# Patient Record
Sex: MALE | Race: WHITE | HISPANIC OR LATINO | Employment: FULL TIME | URBAN - METROPOLITAN AREA
[De-identification: names, ages, dates, MRNs, and addresses within clinical notes are randomized per-mention and may not be internally consistent; named-entity substitution may affect disease eponyms.]

---

## 2022-03-14 PROCEDURE — U0004 COV-19 TEST NON-CDC HGH THRU: HCPCS | Performed by: NURSE PRACTITIONER

## 2022-03-18 ENCOUNTER — TELEPHONE (OUTPATIENT)
Dept: URGENT CARE | Facility: CLINIC | Age: 42
End: 2022-03-18

## 2025-01-04 ENCOUNTER — HOSPITAL ENCOUNTER (EMERGENCY)
Facility: HOSPITAL | Age: 45
Discharge: HOME | End: 2025-01-05
Attending: EMERGENCY MEDICINE | Admitting: EMERGENCY MEDICINE
Payer: MEDICAID

## 2025-01-04 ENCOUNTER — APPOINTMENT (OUTPATIENT)
Dept: GENERAL RADIOLOGY | Facility: HOSPITAL | Age: 45
End: 2025-01-04
Payer: MEDICAID

## 2025-01-04 DIAGNOSIS — J40 BRONCHITIS: Primary | ICD-10-CM

## 2025-01-04 LAB
FLUAV SUBTYP SPEC NAA+PROBE: NOT DETECTED
FLUBV RNA ISLT QL NAA+PROBE: NOT DETECTED
SARS-COV-2 RNA RESP QL NAA+PROBE: NOT DETECTED

## 2025-01-04 PROCEDURE — 87636 SARSCOV2 & INF A&B AMP PRB: CPT

## 2025-01-04 PROCEDURE — 99283 EMERGENCY DEPT VISIT LOW MDM: CPT

## 2025-01-04 PROCEDURE — 71045 X-RAY EXAM CHEST 1 VIEW: CPT

## 2025-01-05 VITALS
DIASTOLIC BLOOD PRESSURE: 84 MMHG | SYSTOLIC BLOOD PRESSURE: 136 MMHG | TEMPERATURE: 98 F | HEART RATE: 74 BPM | RESPIRATION RATE: 18 BRPM | HEIGHT: 70 IN | OXYGEN SATURATION: 97 % | BODY MASS INDEX: 44.09 KG/M2 | WEIGHT: 308 LBS

## 2025-01-05 PROCEDURE — 94640 AIRWAY INHALATION TREATMENT: CPT

## 2025-01-05 PROCEDURE — 63710000001 PREDNISONE PER 1 MG: Performed by: EMERGENCY MEDICINE

## 2025-01-05 RX ORDER — IPRATROPIUM BROMIDE AND ALBUTEROL SULFATE 2.5; .5 MG/3ML; MG/3ML
3 SOLUTION RESPIRATORY (INHALATION) ONCE
Status: COMPLETED | OUTPATIENT
Start: 2025-01-05 | End: 2025-01-05

## 2025-01-05 RX ORDER — PREDNISONE 20 MG/1
40 TABLET ORAL DAILY
Qty: 10 TABLET | Refills: 0 | Status: SHIPPED | OUTPATIENT
Start: 2025-01-05 | End: 2025-01-10

## 2025-01-05 RX ORDER — GUAIFENESIN AND DEXTROMETHORPHAN HYDROBROMIDE 600; 30 MG/1; MG/1
1 TABLET, EXTENDED RELEASE ORAL 2 TIMES DAILY PRN
Qty: 30 TABLET | Refills: 0 | Status: SHIPPED | OUTPATIENT
Start: 2025-01-05

## 2025-01-05 RX ORDER — ALBUTEROL SULFATE 90 UG/1
2 INHALANT RESPIRATORY (INHALATION) EVERY 6 HOURS PRN
Qty: 8 G | Refills: 0 | Status: SHIPPED | OUTPATIENT
Start: 2025-01-05

## 2025-01-05 RX ORDER — PREDNISONE 20 MG/1
60 TABLET ORAL ONCE
Status: COMPLETED | OUTPATIENT
Start: 2025-01-05 | End: 2025-01-05

## 2025-01-05 RX ADMIN — PREDNISONE 60 MG: 20 TABLET ORAL at 00:21

## 2025-01-05 RX ADMIN — IPRATROPIUM BROMIDE AND ALBUTEROL SULFATE 3 ML: .5; 3 SOLUTION RESPIRATORY (INHALATION) at 00:21

## 2025-01-05 NOTE — FSED PROVIDER NOTE
Subjective   History of Present Illness  44 yom reports productive cough, congestion and sore throat for the past several days. He denies fever. He reports his wife has similar symptoms at home.       Review of Systems   Constitutional: Negative.    HENT:  Positive for congestion, rhinorrhea and sore throat.    Respiratory:  Positive for cough and chest tightness. Negative for shortness of breath.    All other systems reviewed and are negative.      Past Medical History:   Diagnosis Date    Depression        No Known Allergies    Past Surgical History:   Procedure Laterality Date    NOSE SURGERY         History reviewed. No pertinent family history.    Social History     Socioeconomic History    Marital status:    Tobacco Use    Smoking status: Never    Smokeless tobacco: Never   Vaping Use    Vaping status: Never Used   Substance and Sexual Activity    Alcohol use: Yes     Comment: occasionally    Drug use: Defer    Sexual activity: Defer           Objective   Physical Exam  Vitals reviewed.   Constitutional:       Appearance: Normal appearance.   HENT:      Head: Normocephalic and atraumatic.      Mouth/Throat:      Mouth: Mucous membranes are moist.      Pharynx: Oropharynx is clear.   Eyes:      Extraocular Movements: Extraocular movements intact.      Pupils: Pupils are equal, round, and reactive to light.   Cardiovascular:      Rate and Rhythm: Normal rate and regular rhythm.      Pulses: Normal pulses.      Heart sounds: Normal heart sounds.   Pulmonary:      Effort: Pulmonary effort is normal. No respiratory distress.      Breath sounds: Wheezing present.   Skin:     General: Skin is warm and dry.      Capillary Refill: Capillary refill takes less than 2 seconds.   Neurological:      Mental Status: He is alert.         Procedures           ED Course                                           Medical Decision Making  This patient presents with symptoms suspicious for likely viral upper respiratory  infection. Based on history and physical doubt sinusitis. COVID/Flu negative. CXR NAD. Do not suspect underlying cardiopulmonary process. I considered, but think unlikely, dangerous causes of this patient's symptoms to include ACS, CHF or COPD exacerbations, pneumonia, pneumothorax. Patient is nontoxic appearing and not in need of emergent medical intervention.    Problems Addressed:  Bronchitis: complicated acute illness or injury    Amount and/or Complexity of Data Reviewed  Radiology: ordered.    Risk  OTC drugs.  Prescription drug management.        Final diagnoses:   Bronchitis       ED Disposition  ED Disposition       ED Disposition   Discharge    Condition   Stable    Comment   --               Josefina Ely MD  [address] [facility] [place] [id number] [phone]    Schedule an appointment as soon as possible for a visit on 1/8/2025  re-evaluation         Medication List        New Prescriptions      guaifenesin-dextromethorphan 600-30 mg  MG tablet sustained-release 12 hour  Take 1 tablet by mouth 2 (Two) Times a Day As Needed (cough).     predniSONE 20 MG tablet  Commonly known as: DELTASONE  Take 2 tablets by mouth Daily for 5 days.               Where to Get Your Medications        These medications were sent to [facility] [id number] [place], [place] - [address] [place] [address] [address]  - [phone] [facility] [phone] [address] [place] [address]      Phone: [phone]   albuterol sulfate  (90 Base) MCG/ACT inhaler  guaifenesin-dextromethorphan 600-30 mg  MG tablet sustained-release 12 hour  predniSONE 20 MG tablet

## 2025-01-10 ENCOUNTER — HOSPITAL ENCOUNTER (EMERGENCY)
Facility: HOSPITAL | Age: 45
Discharge: HOME | End: 2025-01-10
Attending: EMERGENCY MEDICINE
Payer: MEDICAID

## 2025-01-10 ENCOUNTER — APPOINTMENT (OUTPATIENT)
Dept: CT IMAGING | Facility: HOSPITAL | Age: 45
End: 2025-01-10
Payer: MEDICAID

## 2025-01-10 VITALS
SYSTOLIC BLOOD PRESSURE: 126 MMHG | WEIGHT: 308.64 LBS | RESPIRATION RATE: 20 BRPM | HEART RATE: 87 BPM | TEMPERATURE: 100 F | BODY MASS INDEX: 44.19 KG/M2 | HEIGHT: 70 IN | OXYGEN SATURATION: 96 % | DIASTOLIC BLOOD PRESSURE: 92 MMHG

## 2025-01-10 DIAGNOSIS — J03.00 STREP TONSILLITIS: Primary | ICD-10-CM

## 2025-01-10 LAB
ANION GAP SERPL CALCULATED.3IONS-SCNC: 8.1 MMOL/L (ref 5–15)
BASOPHILS # BLD AUTO: 0.02 10*3/MM3 (ref 0–0.2)
BASOPHILS NFR BLD AUTO: 0.1 % (ref 0–1.5)
BUN SERPL-MCNC: 17 MG/DL (ref 6–20)
BUN/CREAT SERPL: 18.1 (ref 7–25)
CALCIUM SPEC-SCNC: 9.4 MG/DL (ref 8.6–10.5)
CHLORIDE SERPL-SCNC: 103 MMOL/L (ref 98–107)
CO2 SERPL-SCNC: 27.9 MMOL/L (ref 22–29)
CREAT SERPL-MCNC: 0.94 MG/DL (ref 0.76–1.27)
DEPRECATED RDW RBC AUTO: 43.6 FL (ref 37–54)
EGFRCR SERPLBLD CKD-EPI 2021: 101.2 ML/MIN/1.73
EOSINOPHIL # BLD AUTO: 0.07 10*3/MM3 (ref 0–0.4)
EOSINOPHIL NFR BLD AUTO: 0.5 % (ref 0.3–6.2)
ERYTHROCYTE [DISTWIDTH] IN BLOOD BY AUTOMATED COUNT: 13.4 % (ref 12.3–15.4)
GLUCOSE SERPL-MCNC: 124 MG/DL (ref 65–99)
HCT VFR BLD AUTO: 43.9 % (ref 37.5–51)
HGB BLD-MCNC: 14.3 G/DL (ref 13–17.7)
IMM GRANULOCYTES # BLD AUTO: 0.04 10*3/MM3 (ref 0–0.05)
IMM GRANULOCYTES NFR BLD AUTO: 0.3 % (ref 0–0.5)
LYMPHOCYTES # BLD AUTO: 2.95 10*3/MM3 (ref 0.7–3.1)
LYMPHOCYTES NFR BLD AUTO: 19 % (ref 19.6–45.3)
MCH RBC QN AUTO: 28.8 PG (ref 26.6–33)
MCHC RBC AUTO-ENTMCNC: 32.6 G/DL (ref 31.5–35.7)
MCV RBC AUTO: 88.3 FL (ref 79–97)
MONOCYTES # BLD AUTO: 1.17 10*3/MM3 (ref 0.1–0.9)
MONOCYTES NFR BLD AUTO: 7.5 % (ref 5–12)
NEUTROPHILS NFR BLD AUTO: 11.27 10*3/MM3 (ref 1.7–7)
NEUTROPHILS NFR BLD AUTO: 72.6 % (ref 42.7–76)
PLATELET # BLD AUTO: 211 10*3/MM3 (ref 140–450)
PMV BLD AUTO: 9.9 FL (ref 6–12)
POTASSIUM SERPL-SCNC: 3.7 MMOL/L (ref 3.5–5.2)
RBC # BLD AUTO: 4.97 10*6/MM3 (ref 4.14–5.8)
SODIUM SERPL-SCNC: 139 MMOL/L (ref 136–145)
STREP A PCR: DETECTED
WBC NRBC COR # BLD AUTO: 15.52 10*3/MM3 (ref 3.4–10.8)

## 2025-01-10 PROCEDURE — 96375 TX/PRO/DX INJ NEW DRUG ADDON: CPT

## 2025-01-10 PROCEDURE — 70491 CT SOFT TISSUE NECK W/DYE: CPT

## 2025-01-10 PROCEDURE — 25510000001 IOPAMIDOL PER 1 ML: Performed by: EMERGENCY MEDICINE

## 2025-01-10 PROCEDURE — 25810000003 SODIUM CHLORIDE 0.9 % SOLUTION: Performed by: EMERGENCY MEDICINE

## 2025-01-10 PROCEDURE — 99285 EMERGENCY DEPT VISIT HI MDM: CPT

## 2025-01-10 PROCEDURE — 87651 STREP A DNA AMP PROBE: CPT | Performed by: EMERGENCY MEDICINE

## 2025-01-10 PROCEDURE — 99284 EMERGENCY DEPT VISIT MOD MDM: CPT | Performed by: EMERGENCY MEDICINE

## 2025-01-10 PROCEDURE — 85025 COMPLETE CBC W/AUTO DIFF WBC: CPT | Performed by: EMERGENCY MEDICINE

## 2025-01-10 PROCEDURE — 80048 BASIC METABOLIC PNL TOTAL CA: CPT | Performed by: EMERGENCY MEDICINE

## 2025-01-10 PROCEDURE — 25010000002 KETOROLAC TROMETHAMINE PER 15 MG: Performed by: EMERGENCY MEDICINE

## 2025-01-10 PROCEDURE — 96365 THER/PROPH/DIAG IV INF INIT: CPT

## 2025-01-10 PROCEDURE — 25010000002 CEFTRIAXONE PER 250 MG: Performed by: EMERGENCY MEDICINE

## 2025-01-10 RX ORDER — LIDOCAINE HYDROCHLORIDE 20 MG/ML
5 SOLUTION OROPHARYNGEAL ONCE
Status: COMPLETED | OUTPATIENT
Start: 2025-01-10 | End: 2025-01-10

## 2025-01-10 RX ORDER — IOPAMIDOL 755 MG/ML
100 INJECTION, SOLUTION INTRAVASCULAR
Status: COMPLETED | OUTPATIENT
Start: 2025-01-10 | End: 2025-01-10

## 2025-01-10 RX ORDER — HYDROCODONE BITARTRATE AND ACETAMINOPHEN 5; 325 MG/1; MG/1
1 TABLET ORAL EVERY 6 HOURS PRN
Qty: 8 TABLET | Refills: 0 | Status: SHIPPED | OUTPATIENT
Start: 2025-01-10

## 2025-01-10 RX ORDER — AMOXICILLIN 875 MG/1
875 TABLET, COATED ORAL 2 TIMES DAILY
Qty: 20 TABLET | Refills: 0 | Status: SHIPPED | OUTPATIENT
Start: 2025-01-10 | End: 2025-01-20

## 2025-01-10 RX ORDER — KETOROLAC TROMETHAMINE 30 MG/ML
15 INJECTION, SOLUTION INTRAMUSCULAR; INTRAVENOUS ONCE
Status: COMPLETED | OUTPATIENT
Start: 2025-01-10 | End: 2025-01-10

## 2025-01-10 RX ADMIN — LIDOCAINE HYDROCHLORIDE 5 ML: 20 SOLUTION ORAL at 05:15

## 2025-01-10 RX ADMIN — CEFTRIAXONE 1000 MG: 1 INJECTION, POWDER, FOR SOLUTION INTRAMUSCULAR; INTRAVENOUS at 05:53

## 2025-01-10 RX ADMIN — KETOROLAC TROMETHAMINE 15 MG: 30 INJECTION, SOLUTION INTRAMUSCULAR; INTRAVENOUS at 05:53

## 2025-01-10 RX ADMIN — SODIUM CHLORIDE 500 ML: 9 INJECTION, SOLUTION INTRAVENOUS at 05:52

## 2025-01-10 RX ADMIN — ALUMINUM HYDROXIDE, MAGNESIUM HYDROXIDE, AND DIMETHICONE: 400; 400; 40 SUSPENSION ORAL at 05:15

## 2025-01-10 RX ADMIN — IOPAMIDOL 100 ML: 755 INJECTION, SOLUTION INTRAVENOUS at 05:18

## 2025-01-10 NOTE — Clinical Note
[facility] FSED [place] [address] [place] [address] [place] [address]  Phone: [phone]    Ana Sherman was seen and treated in our emergency department on 1/10/2025.  He may return to work on 01/14/2025.         Thank you for choosing [facility].    Bess Yan MD

## 2025-01-10 NOTE — FSED PROVIDER NOTE
Subjective   History of Present Illness  44 yom complains of sore throat and pain with swallowing. The patient has been taking Prednisone for a diagnosis of bronchitis last week. He reports the medication is not helping his sore throat but his cough his better. He notes it hurts to swallow.       Review of Systems   Constitutional: Negative.    HENT:  Positive for congestion, sore throat, trouble swallowing and voice change.    All other systems reviewed and are negative.      Past Medical History:   Diagnosis Date    Depression        No Known Allergies    Past Surgical History:   Procedure Laterality Date    NOSE SURGERY         History reviewed. No pertinent family history.    Social History     Socioeconomic History    Marital status:    Tobacco Use    Smoking status: Never    Smokeless tobacco: Never   Vaping Use    Vaping status: Never Used   Substance and Sexual Activity    Alcohol use: Yes     Comment: occasionally    Drug use: Defer    Sexual activity: Defer           Objective   Physical Exam  Vitals reviewed.   Constitutional:       Appearance: He is obese.   HENT:      Head: Normocephalic and atraumatic.      Mouth/Throat:      Mouth: Mucous membranes are moist.      Pharynx: Uvula midline. Pharyngeal swelling and posterior oropharyngeal erythema present.      Tonsils: No tonsillar exudate. 2+ on the right. 2+ on the left.   Eyes:      Conjunctiva/sclera: Conjunctivae normal.   Cardiovascular:      Rate and Rhythm: Normal rate and regular rhythm.   Pulmonary:      Effort: Pulmonary effort is normal.      Breath sounds: Normal breath sounds.   Musculoskeletal:      Cervical back: Normal range of motion and neck supple.   Skin:     General: Skin is warm and dry.   Neurological:      Mental Status: He is alert.         Procedures           ED Course  ED Course as of 01/10/25 0630   Fri Reynold 10, 2025   0550 Discussed test results and treatment plan.  [BM]      ED Course User Index  [BM] Bess Yan  MD Andie                                           Medical Decision Making  44 yom complains of sore throat and pain with swallowing for 6 days. Pt was found to be positive for strep. Pt had tonsillar enlargement on exam therefore a CT was obtained. No evidence of  PTA. Pt give IV Rocephin. Pt was able to tolerate oral fluids and medication in the ER. No stridor or respiratory distress. Pt prescribed Amoxicillin and Hydrocodone for pain. Return precautions discussed.     Problems Addressed:  Strep tonsillitis: complicated acute illness or injury    Amount and/or Complexity of Data Reviewed  Labs: ordered.  Radiology: ordered.    Risk  Prescription drug management.        Final diagnoses:   Strep tonsillitis       ED Disposition  ED Disposition       ED Disposition   Discharge    Condition   Stable    Comment   --               Josefina Ely MD  [address] [place] [address] [phone]    Schedule an appointment as soon as possible for a visit on 1/13/2025           Medication List        New Prescriptions      amoxicillin 875 MG tablet  Commonly known as: AMOXIL  Take 1 tablet by mouth 2 (Two) Times a Day for 10 days.     HYDROcodone-acetaminophen 5-325 MG per tablet  Commonly known as: NORCO  Take 1 tablet by mouth Every 6 (Six) Hours As Needed (pain).            Stop      promethazine-dextromethorphan 6.25-15 MG/5ML syrup  Commonly known as: PROMETHAZINE-DM               Where to Get Your Medications        These medications were sent to [facility] [id number] - [place], [place] - [address] [place] [address] [address]  - [phone]  - [phone] [address] [place] [address]      Phone: [phone]   amoxicillin 875 MG tablet  HYDROcodone-acetaminophen 5-325 MG per tablet

## 2025-01-10 NOTE — Clinical Note
[facility] FSED [place] [address] [place] [address] [place] [address]  Phone: [phone]    Ana Sherman was seen and treated in our emergency department on 1/10/2025.  He may return to work on 01/14/2025.         Thank you for choosing [facility].    Bess Yan MD

## 2025-01-20 ENCOUNTER — HOSPITAL ENCOUNTER (OUTPATIENT)
Facility: HOSPITAL | Age: 45
Discharge: HOME | End: 2025-01-20
Attending: EMERGENCY MEDICINE | Admitting: EMERGENCY MEDICINE

## 2025-01-20 VITALS
HEIGHT: 70 IN | BODY MASS INDEX: 44.19 KG/M2 | SYSTOLIC BLOOD PRESSURE: 150 MMHG | OXYGEN SATURATION: 98 % | DIASTOLIC BLOOD PRESSURE: 98 MMHG | WEIGHT: 308.7 LBS | TEMPERATURE: 98.1 F | RESPIRATION RATE: 18 BRPM | HEART RATE: 80 BPM

## 2025-01-20 DIAGNOSIS — J03.90 TONSILLITIS: Primary | ICD-10-CM

## 2025-01-20 DIAGNOSIS — J02.9 SORE THROAT: ICD-10-CM

## 2025-01-20 LAB
FLUAV SUBTYP SPEC NAA+PROBE: NOT DETECTED
FLUBV RNA ISLT QL NAA+PROBE: NOT DETECTED
SARS-COV-2 RNA RESP QL NAA+PROBE: NOT DETECTED
STREP A PCR: NOT DETECTED

## 2025-01-20 PROCEDURE — 87651 STREP A DNA AMP PROBE: CPT | Performed by: EMERGENCY MEDICINE

## 2025-01-20 PROCEDURE — 87636 SARSCOV2 & INF A&B AMP PRB: CPT | Performed by: EMERGENCY MEDICINE

## 2025-01-20 PROCEDURE — 25010000002 KETOROLAC TROMETHAMINE PER 15 MG

## 2025-01-20 PROCEDURE — 25010000002 METHYLPREDNISOLONE PER 40 MG

## 2025-01-20 PROCEDURE — G0463 HOSPITAL OUTPT CLINIC VISIT: HCPCS | Performed by: EMERGENCY MEDICINE

## 2025-01-20 RX ORDER — METHYLPREDNISOLONE SODIUM SUCCINATE 40 MG/ML
40 INJECTION, POWDER, LYOPHILIZED, FOR SOLUTION INTRAMUSCULAR; INTRAVENOUS ONCE
Status: COMPLETED | OUTPATIENT
Start: 2025-01-20 | End: 2025-01-20

## 2025-01-20 RX ORDER — CETIRIZINE HYDROCHLORIDE, PSEUDOEPHEDRINE HYDROCHLORIDE 5; 120 MG/1; MG/1
1 TABLET, FILM COATED, EXTENDED RELEASE ORAL 2 TIMES DAILY
Qty: 14 TABLET | Refills: 0 | Status: SHIPPED | OUTPATIENT
Start: 2025-01-20 | End: 2025-01-27

## 2025-01-20 RX ORDER — METHYLPREDNISOLONE 4 MG/1
TABLET ORAL
Qty: 21 TABLET | Refills: 0 | Status: SHIPPED | OUTPATIENT
Start: 2025-01-20

## 2025-01-20 RX ORDER — KETOROLAC TROMETHAMINE 30 MG/ML
30 INJECTION, SOLUTION INTRAMUSCULAR; INTRAVENOUS ONCE
Status: COMPLETED | OUTPATIENT
Start: 2025-01-20 | End: 2025-01-20

## 2025-01-20 RX ADMIN — METHYLPREDNISOLONE SODIUM SUCCINATE 40 MG: 40 INJECTION, POWDER, FOR SOLUTION INTRAMUSCULAR; INTRAVENOUS at 19:44

## 2025-01-20 RX ADMIN — KETOROLAC TROMETHAMINE 30 MG: 30 INJECTION, SOLUTION INTRAMUSCULAR at 19:44

## 2025-01-21 NOTE — DISCHARGE INSTRUCTIONS
Starting tomorrow you can take 800 mg of ibuprofen morning and evening of 1000 mg of Tylenol at noon for the next 4 days.    Recommend that you increase your fluid intake with water warm tea with honey which is soothing to your throat    For nasal congestion and drainage in the back your throat begin taking Zyrtec-D    Follow-up with your family doctor as needed return to ER for worsening symptom

## 2025-01-21 NOTE — FSED PROVIDER NOTE
Subjective   History of Present Illness  44-year-old male was seen at this facility last week and diagnosed with strep throat.  The patient had a CT scan of his neck to rule out tonsillar abscess.  Patient was discharged home on Augmentin.  He reports that he has finished his Augmentin.  He reports that he is still having sore throat but that overall his symptoms have improved.  He reports that he took 200 mg of ibuprofen yesterday but has not taken any pain medication today.  He is denying any difficulty swallowing he reports that he has been eating and drinking today.  He denies fever.  He is denying any new symptoms other than concerned that he still has a sore throat after being treated for strep.        Review of Systems   All other systems reviewed and are negative.      Past Medical History:   Diagnosis Date    Depression        No Known Allergies    Past Surgical History:   Procedure Laterality Date    NOSE SURGERY         No family history on file.    Social History     Socioeconomic History    Marital status:    Tobacco Use    Smoking status: Never    Smokeless tobacco: Never   Vaping Use    Vaping status: Never Used   Substance and Sexual Activity    Alcohol use: Yes     Comment: occasionally    Drug use: Defer    Sexual activity: Defer           Objective   Physical Exam  Vitals and nursing note reviewed.   Constitutional:       General: He is not in acute distress.     Appearance: Normal appearance. He is well-developed and normal weight. He is not ill-appearing or toxic-appearing.   HENT:      Head: Normocephalic and atraumatic.      Right Ear: Tympanic membrane and ear canal normal.      Left Ear: Tympanic membrane and ear canal normal.      Nose: Nose normal. No congestion.      Mouth/Throat:      Mouth: Mucous membranes are moist.      Pharynx: Oropharynx is clear. Uvula midline.      Tonsils: No tonsillar exudate or tonsillar abscesses. 1+ on the right. 1+ on the left.      Comments: Uvula  is midline the oropharynx is slightly red and patient does have thick clear post nasal drainage in the oropharynx  Eyes:      Conjunctiva/sclera: Conjunctivae normal.      Pupils: Pupils are equal, round, and reactive to light.   Cardiovascular:      Rate and Rhythm: Normal rate.      Heart sounds: Normal heart sounds.   Pulmonary:      Effort: Pulmonary effort is normal. No respiratory distress.      Breath sounds: Normal breath sounds. Stridor present. No wheezing, rhonchi or rales.   Abdominal:      General: Bowel sounds are normal.      Palpations: Abdomen is soft.   Musculoskeletal:         General: Normal range of motion.      Cervical back: Normal range of motion and neck supple.   Lymphadenopathy:      Cervical: No cervical adenopathy.   Skin:     General: Skin is warm.      Capillary Refill: Capillary refill takes less than 2 seconds.   Neurological:      General: No focal deficit present.      Mental Status: He is alert and oriented to person, place, and time. Mental status is at baseline.         Procedures           ED Course  ED Course as of 01/20/25 2102 Mon Jan 20, 2025   1904 COVID influenza strep not detected [WF]      ED Course User Index  [WF] Jean Paul Silva Jr., APRN                                           Medical Decision Making  Through the  I was able to explain the patient that he needs to increase his over-the-counter pain dose of ibuprofen and he can add minaprine.  Patient appears to be having symptoms related to postnasal drainage.  And recommending an injection of Toradol and a steroid here in this facility.  Will discharge home on Zyrtec-D.  Patient is agreeable to plan of care and discharge.    Problems Addressed:  Sore throat: complicated acute illness or injury  Tonsillitis: complicated acute illness or injury    Risk  OTC drugs.  Prescription drug management.        Final diagnoses:   Tonsillitis   Sore throat       ED Disposition  ED Disposition       ED  Disposition   Discharge    Condition   Stable    Comment   --               Josefina Ely MD  [address] [place] [address] [phone]               Medication List        New Prescriptions      cetirizine-pseudoephedrine 5-120 MG per 12 hr tablet  Commonly known as: ZyrTEC-D  Take 1 tablet by mouth 2 (Two) Times a Day for 7 days.     methylPREDNISolone 4 MG dose pack  Commonly known as: MEDROL  Take as directed on package instructions.               Where to Get Your Medications        These medications were sent to [facility] [id number] [place] [address] [address]  - [phone]  - [phone] [address] [place] [address]      Phone: [phone]   cetirizine-pseudoephedrine 5-120 MG per 12 hr tablet  methylPREDNISolone 4 MG dose pack     I was in the department and available for consult.  I did not provide any direct patient care at this time

## 2025-05-12 ENCOUNTER — APPOINTMENT (OUTPATIENT)
Dept: CT IMAGING | Facility: HOSPITAL | Age: 45
End: 2025-05-12

## 2025-05-12 ENCOUNTER — HOSPITAL ENCOUNTER (EMERGENCY)
Facility: HOSPITAL | Age: 45
Discharge: HOME | End: 2025-05-12
Attending: EMERGENCY MEDICINE | Admitting: EMERGENCY MEDICINE

## 2025-05-12 VITALS
OXYGEN SATURATION: 98 % | RESPIRATION RATE: 16 BRPM | HEIGHT: 67 IN | HEART RATE: 63 BPM | BODY MASS INDEX: 48.33 KG/M2 | TEMPERATURE: 98.5 F | WEIGHT: 307.9 LBS | SYSTOLIC BLOOD PRESSURE: 149 MMHG | DIASTOLIC BLOOD PRESSURE: 96 MMHG

## 2025-05-12 DIAGNOSIS — R19.7 DIARRHEA, UNSPECIFIED TYPE: Primary | ICD-10-CM

## 2025-05-12 DIAGNOSIS — K76.0 FATTY LIVER: ICD-10-CM

## 2025-05-12 LAB
ALBUMIN SERPL-MCNC: 3.9 G/DL (ref 3.5–5.2)
ALBUMIN/GLOB SERPL: 1.5 G/DL
ALP SERPL-CCNC: 78 U/L (ref 39–117)
ALT SERPL W P-5'-P-CCNC: 31 U/L (ref 1–41)
ANION GAP SERPL CALCULATED.3IONS-SCNC: 8.1 MMOL/L (ref 5–15)
AST SERPL-CCNC: 27 U/L (ref 1–40)
BASOPHILS # BLD AUTO: 0.04 10*3/MM3 (ref 0–0.2)
BASOPHILS NFR BLD AUTO: 0.6 % (ref 0–1.5)
BILIRUB SERPL-MCNC: 0.3 MG/DL (ref 0–1.2)
BUN SERPL-MCNC: 13 MG/DL (ref 6–20)
BUN/CREAT SERPL: 13 (ref 7–25)
CALCIUM SPEC-SCNC: 8.4 MG/DL (ref 8.6–10.5)
CHLORIDE SERPL-SCNC: 105 MMOL/L (ref 98–107)
CO2 SERPL-SCNC: 27.9 MMOL/L (ref 22–29)
CREAT SERPL-MCNC: 1 MG/DL (ref 0.76–1.27)
DEPRECATED RDW RBC AUTO: 42.8 FL (ref 37–54)
EGFRCR SERPLBLD CKD-EPI 2021: 93.4 ML/MIN/1.73
EOSINOPHIL # BLD AUTO: 0.21 10*3/MM3 (ref 0–0.4)
EOSINOPHIL NFR BLD AUTO: 3 % (ref 0.3–6.2)
ERYTHROCYTE [DISTWIDTH] IN BLOOD BY AUTOMATED COUNT: 13.1 % (ref 12.3–15.4)
GLOBULIN UR ELPH-MCNC: 2.6 GM/DL
GLUCOSE SERPL-MCNC: 120 MG/DL (ref 65–99)
HCT VFR BLD AUTO: 41.4 % (ref 37.5–51)
HGB BLD-MCNC: 13.5 G/DL (ref 13–17.7)
IMM GRANULOCYTES # BLD AUTO: 0.01 10*3/MM3 (ref 0–0.05)
IMM GRANULOCYTES NFR BLD AUTO: 0.1 % (ref 0–0.5)
LIPASE SERPL-CCNC: 19 U/L (ref 13–60)
LYMPHOCYTES # BLD AUTO: 2.82 10*3/MM3 (ref 0.7–3.1)
LYMPHOCYTES NFR BLD AUTO: 40.1 % (ref 19.6–45.3)
MCH RBC QN AUTO: 28.7 PG (ref 26.6–33)
MCHC RBC AUTO-ENTMCNC: 32.6 G/DL (ref 31.5–35.7)
MCV RBC AUTO: 88.1 FL (ref 79–97)
MONOCYTES # BLD AUTO: 0.58 10*3/MM3 (ref 0.1–0.9)
MONOCYTES NFR BLD AUTO: 8.2 % (ref 5–12)
NEUTROPHILS NFR BLD AUTO: 3.38 10*3/MM3 (ref 1.7–7)
NEUTROPHILS NFR BLD AUTO: 48 % (ref 42.7–76)
PLATELET # BLD AUTO: 204 10*3/MM3 (ref 140–450)
PMV BLD AUTO: 10.1 FL (ref 6–12)
POTASSIUM SERPL-SCNC: 3.8 MMOL/L (ref 3.5–5.2)
PROT SERPL-MCNC: 6.5 G/DL (ref 6–8.5)
RBC # BLD AUTO: 4.7 10*6/MM3 (ref 4.14–5.8)
SODIUM SERPL-SCNC: 141 MMOL/L (ref 136–145)
WBC NRBC COR # BLD AUTO: 7.04 10*3/MM3 (ref 3.4–10.8)

## 2025-05-12 PROCEDURE — 85025 COMPLETE CBC W/AUTO DIFF WBC: CPT | Performed by: NURSE PRACTITIONER

## 2025-05-12 PROCEDURE — 25510000001 IOPAMIDOL PER 1 ML: Performed by: EMERGENCY MEDICINE

## 2025-05-12 PROCEDURE — 74177 CT ABD & PELVIS W/CONTRAST: CPT

## 2025-05-12 PROCEDURE — 96361 HYDRATE IV INFUSION ADD-ON: CPT

## 2025-05-12 PROCEDURE — 83690 ASSAY OF LIPASE: CPT | Performed by: NURSE PRACTITIONER

## 2025-05-12 PROCEDURE — 99285 EMERGENCY DEPT VISIT HI MDM: CPT

## 2025-05-12 PROCEDURE — 25810000003 SODIUM CHLORIDE 0.9 % SOLUTION: Performed by: NURSE PRACTITIONER

## 2025-05-12 PROCEDURE — 80053 COMPREHEN METABOLIC PANEL: CPT | Performed by: NURSE PRACTITIONER

## 2025-05-12 PROCEDURE — 25010000002 DEXAMETHASONE PER 1 MG: Performed by: NURSE PRACTITIONER

## 2025-05-12 PROCEDURE — 25010000002 KETOROLAC TROMETHAMINE PER 15 MG: Performed by: NURSE PRACTITIONER

## 2025-05-12 PROCEDURE — 96374 THER/PROPH/DIAG INJ IV PUSH: CPT

## 2025-05-12 PROCEDURE — 25010000002 ONDANSETRON PER 1 MG: Performed by: NURSE PRACTITIONER

## 2025-05-12 PROCEDURE — 96375 TX/PRO/DX INJ NEW DRUG ADDON: CPT

## 2025-05-12 RX ORDER — IOPAMIDOL 755 MG/ML
100 INJECTION, SOLUTION INTRAVASCULAR
Status: COMPLETED | OUTPATIENT
Start: 2025-05-12 | End: 2025-05-12

## 2025-05-12 RX ORDER — ONDANSETRON 2 MG/ML
4 INJECTION INTRAMUSCULAR; INTRAVENOUS ONCE
Status: COMPLETED | OUTPATIENT
Start: 2025-05-12 | End: 2025-05-12

## 2025-05-12 RX ORDER — KETOROLAC TROMETHAMINE 30 MG/ML
15 INJECTION, SOLUTION INTRAMUSCULAR; INTRAVENOUS ONCE
Status: COMPLETED | OUTPATIENT
Start: 2025-05-12 | End: 2025-05-12

## 2025-05-12 RX ORDER — SODIUM CHLORIDE 0.9 % (FLUSH) 0.9 %
10 SYRINGE (ML) INJECTION AS NEEDED
Status: DISCONTINUED | OUTPATIENT
Start: 2025-05-12 | End: 2025-05-12 | Stop reason: HOSPADM

## 2025-05-12 RX ORDER — DEXAMETHASONE SODIUM PHOSPHATE 4 MG/ML
10 INJECTION, SOLUTION INTRA-ARTICULAR; INTRALESIONAL; INTRAMUSCULAR; INTRAVENOUS; SOFT TISSUE ONCE
Status: COMPLETED | OUTPATIENT
Start: 2025-05-12 | End: 2025-05-12

## 2025-05-12 RX ADMIN — KETOROLAC TROMETHAMINE 15 MG: 30 INJECTION, SOLUTION INTRAMUSCULAR at 16:59

## 2025-05-12 RX ADMIN — DEXAMETHASONE SODIUM PHOSPHATE 10 MG: 4 INJECTION, SOLUTION INTRAMUSCULAR; INTRAVENOUS at 16:59

## 2025-05-12 RX ADMIN — IOPAMIDOL 100 ML: 755 INJECTION, SOLUTION INTRAVENOUS at 17:43

## 2025-05-12 RX ADMIN — SODIUM CHLORIDE 1000 ML: 9 INJECTION, SOLUTION INTRAVENOUS at 16:59

## 2025-05-12 RX ADMIN — ONDANSETRON 4 MG: 2 INJECTION, SOLUTION INTRAMUSCULAR; INTRAVENOUS at 16:59

## 2025-05-12 NOTE — FSED PROVIDER NOTE
"Subjective   History of Present Illness  45-year-old male presents with diarrhea for the past 2 days with abdominal pain.  He states on Saturday he ate pork barbecue and ever since Sunday he has had diarrhea, black at times.  He states yesterday he had \"a lot of diarrhea\".  Today he has had 4-5 episodes and describes it as watery.  He has not taken anything to attempt to stop the diarrhea such as Pepto-Bismol or Imodium.  He denies any vomiting.  He has a slight headache but no fever.  No one else that ate the barbecue was sick.  Patient reports no past medical history no past surgical history.  He has had some ibuprofen.    History provided by:  Patient   used: Yes        Review of Systems   Constitutional:  Negative for fever.   Gastrointestinal:  Positive for abdominal distention, abdominal pain and diarrhea. Negative for nausea and vomiting.        Patient describes black watery stools.   Neurological:  Positive for headaches.   All other systems reviewed and are negative.      Past Medical History:   Diagnosis Date    Depression        No Known Allergies    Past Surgical History:   Procedure Laterality Date    NOSE SURGERY         History reviewed. No pertinent family history.    Social History     Socioeconomic History    Marital status:    Tobacco Use    Smoking status: Never    Smokeless tobacco: Never   Vaping Use    Vaping status: Never Used   Substance and Sexual Activity    Alcohol use: Yes     Comment: occasionally    Drug use: Defer    Sexual activity: Defer           Objective   Physical Exam  Constitutional:       General: He is not in acute distress.     Appearance: Normal appearance. He is obese. He is not ill-appearing, toxic-appearing or diaphoretic.   HENT:      Right Ear: Tympanic membrane, ear canal and external ear normal.      Left Ear: Tympanic membrane, ear canal and external ear normal.      Mouth/Throat:      Mouth: Mucous membranes are moist.      Pharynx: " Oropharynx is clear. No oropharyngeal exudate or posterior oropharyngeal erythema.   Cardiovascular:      Rate and Rhythm: Normal rate and regular rhythm.      Heart sounds: Normal heart sounds. No murmur heard.     No friction rub. No gallop.   Pulmonary:      Effort: Pulmonary effort is normal. No respiratory distress.      Breath sounds: Normal breath sounds. No stridor. No wheezing, rhonchi or rales.   Abdominal:      General: Abdomen is protuberant. Bowel sounds are normal. There is no distension. There are no signs of injury.      Palpations: Abdomen is soft. There is no hepatomegaly, splenomegaly or mass.      Tenderness: There is abdominal tenderness in the right upper quadrant.   Neurological:      Mental Status: He is alert.         Procedures           ED Course  ED Course as of 05/12/25 1927   Mon May 12, 2025   1717 WBC: 7.04 [SJ]   1717 RBC: 4.70 [SJ]   1717 Hemoglobin: 13.5 [SJ]   1717 Hematocrit: 41.4 [SJ]   1717 MCV: 88.1 [SJ]   1717 MCH: 28.7 [SJ]   1718 MCHC: 32.6 [SJ]   1718 RDW: 13.1 [SJ]   1718 RDW-SD: 42.8 [SJ]   1718 MPV: 10.1 [SJ]   1718 Platelets: 204 [SJ]   1718 Neutrophil Rel %: 48.0 [SJ]   1718 Lymphocyte Rel %: 40.1 [SJ]   1718 Monocyte Rel %: 8.2 [SJ]   1718 Eosinophil Rel %: 3.0 [SJ]   1718 Basophil Rel %: 0.6 [SJ]   1718 Immature Granulocyte Rel %: 0.1 [SJ]   1718 Neutrophils Absolute: 3.38 [SJ]   1718 Lymphocytes Absolute: 2.82 [SJ]   1718 Monocytes Absolute: 0.58 [SJ]   1718 Eosinophils Absolute: 0.21 [SJ]   1718 Basophils Absolute: 0.04 [SJ]   1718 Immature Grans, Absolute: 0.01 [SJ]   1734 Glucose(!): 120 [SJ]   1734 BUN: 13 [SJ]   1734 Creatinine: 1.00 [SJ]   1734 Sodium: 141 [SJ]   1734 Potassium: 3.8 [SJ]   1734 Chloride: 105 [SJ]   1734 CO2: 27.9 [SJ]   1734 Calcium(!): 8.4 [SJ]   1734 Total Protein: 6.5 [SJ]   1734 Albumin: 3.9 [SJ]   1734 ALT (SGPT): 31 [SJ]   1734 AST (SGOT): 27 [SJ]   1734 Alkaline Phosphatase: 78 [SJ]   1734 Total Bilirubin: 0.3 [SJ]   1734 Globulin: 2.6  [SJ]   1734 A/G Ratio: 1.5 [SJ]   1734 BUN/Creatinine Ratio: 13.0 [SJ]   1734 Anion Gap: 8.1 [SJ]   1734 eGFR: 93.4 [SJ]   1734 Lipase: 19 [SJ]   1840 Reading Physician Reading Date Result Priority  Miah Longoria MD  [phone]  5/12/2025 STAT    Narrative & Impression  CT ABDOMEN PELVIS W CONTRAST     Date of Exam: 5/12/2025 5:35 PM EDT     Indication: abd pain diarrhea after eating pork barbeque.     Comparison: None available.     Technique: Axial CT images were obtained of the abdomen and pelvis following the uneventful intravenous administration of iodinated contrast. Sagittal and coronal reconstructions were performed.  Automated exposure control and iterative reconstruction   methods were used.     Findings:     Liver: The liver is prominent in size and decreased in attenuation. No focal liver lesion is seen. No biliary dilation is seen.     Gallbladder: Unremarkable.     Pancreas: Unremarkable.     Spleen: Unremarkable.     Adrenal glands: Unremarkable.     Genitourinary tract: Right renal cyst is noted. Kidneys are otherwise unremarkable. No hydronephrosis is seen. Visualized portions of the ureters, urinary bladder, and prostate gland appear unremarkable.     Gastrointestinal tract: The visualized hollow viscera demonstrate no focal lesion.  There is no evidence of mechanical bowel obstruction.     Appendix: No findings to suggest acute appendicitis.     Other findings: No free air or free fluid is identified. No pathologically enlarged lymph nodes are seen. The abdominal aorta and IVC appear unremarkable.     Bones and soft tissues: No acute osseous lesion is identified. Grade 1 anterolisthesis at L5-S1. Superficial soft tissues demonstrate no acute abnormality.     Lung bases: The visualized lung bases are clear.     IMPRESSION:  Impression:  1.No acute abnormality identified within the abdomen or pelvis.  2.Hepatic steatosis.  3.Additional findings as detailed above.        Electronically Signed:  "Miah Longoria MD    5/12/2025 6:02 PM EDT    Workstation ID: [id number]   [SJ]      ED Course User Index  [SJ] Luz Eric APRN                                           Medical Decision Making  45-year-old male presents with diarrhea for the past 2 days with abdominal pain.  He states on Saturday he ate pork barbecue and ever since Sunday he has had diarrhea, black at times.  He states yesterday he had \"a lot of diarrhea\".  Today he has had 4-5 episodes and describes it as watery.  He has not taken anything to attempt to stop the diarrhea such as Pepto-Bismol or Imodium.  He denies any vomiting.  He has a slight headache but no fever.  No one else that ate the barbecue was sick.  Patient reports no past medical history no past surgical history.  He has had some ibuprofen.  Differential diagnoses include viral diarrheal illness, bowel obstruction, diverticulitis, diverticulosis, bacterial diarrheal illness,  cholecystitis.  This does not constitute all considered diagnoses.  Patient's lab work showed no evidence of elevated white count, glucose was significant at 120 and calcium was 8.4 otherwise the CMP was normal.  Lipase was 19.  CT scan with contrast of the abdomen and pelvis showed no acute abnormality identified within the abdomen or pelvis.  He does have fatty liver.  Patient was advised to follow-up with primary care, drink plenty of fluids, advance diet as tolerated.  Return for worsening symptoms.  Patient verbalized understanding.   was used.    Problems Addressed:  Diarrhea, unspecified type: complicated acute illness or injury  Fatty liver: complicated acute illness or injury    Amount and/or Complexity of Data Reviewed  Labs: ordered. Decision-making details documented in ED Course.  Radiology: ordered.    Risk  Prescription drug management.        Final diagnoses:   Diarrhea, unspecified type   Fatty liver       ED Disposition  ED Disposition       ED Disposition   Discharge    " Condition   Stable    Comment   --               Josefina Ely MD  [address]  [place] [address] [phone]    Call            Medication List      No changes were made to your prescriptions during this visit.

## 2025-05-12 NOTE — Clinical Note
[facility] FSED [place] [address] [place] [address] [place] [address]  Phone: [phone]    Ana Sherman was seen and treated in our emergency department on 5/12/2025.  He may return to work on 05/14/2025.         Thank you for choosing [facility].    Luz Eric APRN

## 2025-05-12 NOTE — Clinical Note
[facility] FSED [place] [address] [place] [address] [place] [address]  Phone: [phone]    Ana Sherman was seen and treated in our emergency department on 5/12/2025.  He may return to work on 05/14/2025.         Thank you for choosing [facility].    Luz Eric APRN

## 2025-05-12 NOTE — DISCHARGE INSTRUCTIONS
Be sure to drink plenty of fluids advance your diet as tolerated.  Start with bananas, rice, toast, applesauce.,     Take Tylenol and ibuprofen as needed for pain.    Return to the emergency department for worsening symptoms, coughing up blood, vomiting up blood or having bloody diarrhea, chest pain or shortness of breath or development of fever